# Patient Record
Sex: MALE | Race: WHITE | NOT HISPANIC OR LATINO | Employment: FULL TIME | URBAN - METROPOLITAN AREA
[De-identification: names, ages, dates, MRNs, and addresses within clinical notes are randomized per-mention and may not be internally consistent; named-entity substitution may affect disease eponyms.]

---

## 2017-09-01 ENCOUNTER — HISTORICAL (OUTPATIENT)
Dept: LAB | Facility: HOSPITAL | Age: 32
End: 2017-09-01

## 2017-12-01 ENCOUNTER — HISTORICAL (OUTPATIENT)
Dept: LAB | Facility: HOSPITAL | Age: 32
End: 2017-12-01

## 2017-12-03 LAB — FINAL CULTURE: NO GROWTH

## 2018-10-26 ENCOUNTER — HISTORICAL (OUTPATIENT)
Dept: ADMINISTRATIVE | Facility: HOSPITAL | Age: 33
End: 2018-10-26

## 2018-10-26 LAB
ABS NEUT (OLG): 5.2
ALBUMIN SERPL-MCNC: 4.6 GM/DL (ref 3.4–5)
ALBUMIN/GLOB SERPL: 1.92 {RATIO} (ref 1.5–2.5)
ALP SERPL-CCNC: 50 UNIT/L (ref 38–126)
ALT SERPL-CCNC: 21 UNIT/L (ref 7–52)
APPEARANCE, UA: CLEAR
AST SERPL-CCNC: 18 UNIT/L (ref 15–37)
BACTERIA #/AREA URNS AUTO: NORMAL /HPF
BILIRUB SERPL-MCNC: 1.8 MG/DL (ref 0.2–1)
BILIRUB UR QL STRIP: NEGATIVE MG/DL
BILIRUBIN DIRECT+TOT PNL SERPL-MCNC: 0.4 MG/DL (ref 0–0.5)
BILIRUBIN DIRECT+TOT PNL SERPL-MCNC: 1.4 MG/DL
BUN SERPL-MCNC: 9 MG/DL (ref 7–18)
CALCIUM SERPL-MCNC: 9 MG/DL (ref 8.5–10)
CHLORIDE SERPL-SCNC: 101 MMOL/L (ref 98–107)
CHOLEST SERPL-MCNC: 140 MG/DL (ref 0–200)
CHOLEST/HDLC SERPL: 3.7 {RATIO}
CO2 SERPL-SCNC: 31 MMOL/L (ref 21–32)
COLOR UR: NORMAL
CREAT SERPL-MCNC: 0.79 MG/DL (ref 0.6–1.3)
ERYTHROCYTE [DISTWIDTH] IN BLOOD BY AUTOMATED COUNT: 12.7 % (ref 11.5–17)
GLOBULIN SER-MCNC: 2.4 GM/DL (ref 1.2–3)
GLUCOSE (UA): NEGATIVE MG/DL
GLUCOSE SERPL-MCNC: 93 MG/DL (ref 74–106)
HCT VFR BLD AUTO: 46.5 % (ref 42–52)
HDLC SERPL-MCNC: 38 MG/DL (ref 35–60)
HGB BLD-MCNC: 15.8 GM/DL (ref 14–18)
HGB UR QL STRIP: NEGATIVE UNIT/L
KETONES UR QL STRIP: NEGATIVE MG/DL
LDLC SERPL CALC-MCNC: 93 MG/DL (ref 0–129)
LEUKOCYTE ESTERASE UR QL STRIP: NEGATIVE UNIT/L
LYMPHOCYTES # BLD AUTO: 2.3 X10(3)/MCL (ref 0.6–3.4)
LYMPHOCYTES NFR BLD AUTO: 26.9 % (ref 13–40)
MCH RBC QN AUTO: 31.6 PG (ref 27–31.2)
MCHC RBC AUTO-ENTMCNC: 34 GM/DL (ref 32–36)
MCV RBC AUTO: 93 FL (ref 80–94)
MONOCYTES # BLD AUTO: 0.9 X10(3)/MCL (ref 0–1.8)
MONOCYTES NFR BLD AUTO: 10.8 % (ref 0.1–24)
NEUTROPHILS NFR BLD AUTO: 62.3 % (ref 47–80)
NITRITE UR QL STRIP.AUTO: NEGATIVE
PH UR STRIP: 7.5 [PH]
PLATELET # BLD AUTO: 228 X10(3)/MCL (ref 130–400)
PMV BLD AUTO: 9.4 FL
POTASSIUM SERPL-SCNC: 3.6 MMOL/L (ref 3.5–5.1)
PROT SERPL-MCNC: 7 GM/DL (ref 6.4–8.2)
PROT UR QL STRIP: NEGATIVE MG/DL
RBC # BLD AUTO: 5 X10(6)/MCL (ref 4.7–6.1)
RBC #/AREA URNS HPF: NORMAL /HPF
SODIUM SERPL-SCNC: 137 MMOL/L (ref 136–145)
SP GR UR STRIP: 1.01
SQUAMOUS EPITHELIAL, UA: NORMAL /LPF
TRIGL SERPL-MCNC: 73 MG/DL (ref 30–150)
UROBILINOGEN UR STRIP-ACNC: 0.2 MG/DL
VLDLC SERPL CALC-MCNC: 14.6 MG/DL
WBC # SPEC AUTO: 8.4 X10(3)/MCL (ref 4.5–11.5)
WBC #/AREA URNS AUTO: NORMAL /[HPF]

## 2019-08-26 ENCOUNTER — HISTORICAL (OUTPATIENT)
Dept: LAB | Facility: HOSPITAL | Age: 34
End: 2019-08-26

## 2019-09-20 ENCOUNTER — HISTORICAL (OUTPATIENT)
Dept: ADMINISTRATIVE | Facility: HOSPITAL | Age: 34
End: 2019-09-20

## 2019-09-20 LAB
ABS NEUT (OLG): 3.4 X10(3)/MCL (ref 2.1–9.2)
ALBUMIN SERPL-MCNC: 4.7 GM/DL (ref 3.4–5)
ALBUMIN/GLOB SERPL: 2.61 {RATIO} (ref 1.5–2.5)
ALP SERPL-CCNC: 47 UNIT/L (ref 38–126)
ALT SERPL-CCNC: 27 UNIT/L (ref 7–52)
APPEARANCE, UA: CLEAR
AST SERPL-CCNC: 26 UNIT/L (ref 15–37)
BACTERIA #/AREA URNS AUTO: ABNORMAL /HPF
BILIRUB SERPL-MCNC: 1.4 MG/DL (ref 0.2–1)
BILIRUB UR QL STRIP: NEGATIVE MG/DL
BILIRUBIN DIRECT+TOT PNL SERPL-MCNC: 0.3 MG/DL (ref 0–0.5)
BILIRUBIN DIRECT+TOT PNL SERPL-MCNC: 1.1 MG/DL
BUN SERPL-MCNC: 13 MG/DL (ref 7–18)
CALCIUM SERPL-MCNC: 9.2 MG/DL (ref 8.5–10)
CHLORIDE SERPL-SCNC: 102 MMOL/L (ref 98–107)
CHOLEST SERPL-MCNC: 160 MG/DL (ref 0–200)
CHOLEST/HDLC SERPL: 3.2 {RATIO}
CO2 SERPL-SCNC: 30 MMOL/L (ref 21–32)
COLOR UR: YELLOW
CREAT SERPL-MCNC: 0.89 MG/DL (ref 0.6–1.3)
ERYTHROCYTE [DISTWIDTH] IN BLOOD BY AUTOMATED COUNT: 12.7 % (ref 11.5–17)
GLOBULIN SER-MCNC: 1.8 GM/DL (ref 1.2–3)
GLUCOSE (UA): NEGATIVE MG/DL
GLUCOSE SERPL-MCNC: 109 MG/DL (ref 74–106)
HCT VFR BLD AUTO: 43.3 % (ref 42–52)
HDLC SERPL-MCNC: 50 MG/DL (ref 35–60)
HGB BLD-MCNC: 15.4 GM/DL (ref 14–18)
HGB UR QL STRIP: NEGATIVE UNIT/L
KETONES UR QL STRIP: NEGATIVE MG/DL
LDLC SERPL CALC-MCNC: 114 MG/DL (ref 0–129)
LEUKOCYTE ESTERASE UR QL STRIP: NEGATIVE UNIT/L
LYMPHOCYTES # BLD AUTO: 1.5 X10(3)/MCL (ref 0.6–3.4)
LYMPHOCYTES NFR BLD AUTO: 27.2 % (ref 13–40)
MCH RBC QN AUTO: 31.4 PG (ref 27–31.2)
MCHC RBC AUTO-ENTMCNC: 36 GM/DL (ref 32–36)
MCV RBC AUTO: 88 FL (ref 80–94)
MONOCYTES # BLD AUTO: 0.5 X10(3)/MCL (ref 0.1–1.3)
MONOCYTES NFR BLD AUTO: 9.9 % (ref 0.1–24)
NEUTROPHILS NFR BLD AUTO: 62.9 % (ref 47–80)
NITRITE UR QL STRIP.AUTO: NEGATIVE
PH UR STRIP: 7 [PH]
PLATELET # BLD AUTO: 182 X10(3)/MCL (ref 130–400)
PMV BLD AUTO: 9.7 FL (ref 9.4–12.4)
POTASSIUM SERPL-SCNC: 3.8 MMOL/L (ref 3.5–5.1)
PROT SERPL-MCNC: 6.5 GM/DL (ref 6.4–8.2)
PROT UR QL STRIP: ABNORMAL MG/DL
RBC # BLD AUTO: 4.91 X10(6)/MCL (ref 4.7–6.1)
RBC #/AREA URNS HPF: ABNORMAL /HPF
SODIUM SERPL-SCNC: 138 MMOL/L (ref 136–145)
SP GR UR STRIP: 1.02
SQUAMOUS EPITHELIAL, UA: ABNORMAL /LPF
TRIGL SERPL-MCNC: 64 MG/DL (ref 30–150)
UROBILINOGEN UR STRIP-ACNC: 0.2 MG/DL
VLDLC SERPL CALC-MCNC: 12.8 MG/DL
WBC # SPEC AUTO: 5.4 X10(3)/MCL (ref 4.5–11.5)
WBC #/AREA URNS AUTO: ABNORMAL /[HPF]

## 2020-09-22 ENCOUNTER — HISTORICAL (OUTPATIENT)
Dept: ADMINISTRATIVE | Facility: HOSPITAL | Age: 35
End: 2020-09-22

## 2020-09-22 LAB
ABS NEUT (OLG): 3.2 X10(3)/MCL (ref 2.1–9.2)
ALBUMIN SERPL-MCNC: 4.7 GM/DL (ref 3.4–5)
ALBUMIN/GLOB SERPL: 2.94 {RATIO} (ref 1.5–2.5)
ALP SERPL-CCNC: 51 UNIT/L (ref 38–126)
ALT SERPL-CCNC: 20 UNIT/L (ref 7–52)
APPEARANCE, UA: CLEAR
AST SERPL-CCNC: 17 UNIT/L (ref 15–37)
BACTERIA #/AREA URNS AUTO: NORMAL /HPF
BILIRUB SERPL-MCNC: 1.9 MG/DL (ref 0.2–1)
BILIRUB UR QL STRIP: NEGATIVE MG/DL
BILIRUBIN DIRECT+TOT PNL SERPL-MCNC: 0.4 MG/DL (ref 0–0.5)
BILIRUBIN DIRECT+TOT PNL SERPL-MCNC: 1.5 MG/DL
BUN SERPL-MCNC: 10 MG/DL (ref 7–18)
CALCIUM SERPL-MCNC: 9.4 MG/DL (ref 8.5–10.1)
CHLORIDE SERPL-SCNC: 103 MMOL/L (ref 98–107)
CHOLEST SERPL-MCNC: 138 MG/DL (ref 0–200)
CHOLEST/HDLC SERPL: 3.3 {RATIO}
CO2 SERPL-SCNC: 30 MMOL/L (ref 21–32)
COLOR UR: YELLOW
CREAT SERPL-MCNC: 0.79 MG/DL (ref 0.6–1.3)
ERYTHROCYTE [DISTWIDTH] IN BLOOD BY AUTOMATED COUNT: 12.8 % (ref 11.5–17)
GLOBULIN SER-MCNC: 1.6 GM/DL (ref 1.2–3)
GLUCOSE (UA): NEGATIVE MG/DL
GLUCOSE SERPL-MCNC: 108 MG/DL (ref 74–106)
HCT VFR BLD AUTO: 45.9 % (ref 42–52)
HDLC SERPL-MCNC: 42 MG/DL (ref 35–60)
HGB BLD-MCNC: 16 GM/DL (ref 14–18)
HGB UR QL STRIP: NEGATIVE UNIT/L
KETONES UR QL STRIP: NEGATIVE MG/DL
LDLC SERPL CALC-MCNC: 100 MG/DL (ref 0–129)
LEUKOCYTE ESTERASE UR QL STRIP: NEGATIVE UNIT/L
LYMPHOCYTES # BLD AUTO: 1.7 X10(3)/MCL (ref 0.6–3.4)
LYMPHOCYTES NFR BLD AUTO: 31 % (ref 13–40)
MCH RBC QN AUTO: 31.2 PG (ref 27–31.2)
MCHC RBC AUTO-ENTMCNC: 35 GM/DL (ref 32–36)
MCV RBC AUTO: 90 FL (ref 80–94)
MONOCYTES # BLD AUTO: 0.5 X10(3)/MCL (ref 0.1–1.3)
MONOCYTES NFR BLD AUTO: 9.5 % (ref 0.1–24)
NEUTROPHILS NFR BLD AUTO: 59.5 % (ref 47–80)
NITRITE UR QL STRIP.AUTO: NEGATIVE
PH UR STRIP: 8 [PH]
PLATELET # BLD AUTO: 217 X10(3)/MCL (ref 130–400)
PMV BLD AUTO: 9.9 FL (ref 9.4–12.4)
POTASSIUM SERPL-SCNC: 4.4 MMOL/L (ref 3.5–5.1)
PROT SERPL-MCNC: 6.3 GM/DL (ref 6.4–8.2)
PROT UR QL STRIP: NEGATIVE MG/DL
RBC # BLD AUTO: 5.13 X10(6)/MCL (ref 4.7–6.1)
RBC #/AREA URNS HPF: NORMAL /HPF
SODIUM SERPL-SCNC: 138 MMOL/L (ref 136–145)
SP GR UR STRIP: 1.02
SQUAMOUS EPITHELIAL, UA: NORMAL /LPF
TRIGL SERPL-MCNC: 75 MG/DL (ref 30–150)
TSH SERPL-ACNC: 1.24 MIU/ML (ref 0.35–4.94)
UROBILINOGEN UR STRIP-ACNC: 0.2 MG/DL
VLDLC SERPL CALC-MCNC: 15 MG/DL
WBC # SPEC AUTO: 5.4 X10(3)/MCL (ref 4.5–11.5)
WBC #/AREA URNS AUTO: NORMAL /[HPF]

## 2020-09-23 LAB
EST. AVERAGE GLUCOSE BLD GHB EST-MCNC: 97 MG/DL
HBA1C MFR BLD: 5 % (ref 4.4–6.4)

## 2021-09-28 ENCOUNTER — HISTORICAL (OUTPATIENT)
Dept: ADMINISTRATIVE | Facility: HOSPITAL | Age: 36
End: 2021-09-28

## 2021-09-28 LAB
ABS NEUT (OLG): 2.9 X10(3)/MCL (ref 2.1–9.2)
ALBUMIN SERPL-MCNC: 4.8 GM/DL (ref 3.4–5)
ALBUMIN/GLOB SERPL: 2.67 {RATIO} (ref 1.5–2.5)
ALP SERPL-CCNC: 51 UNIT/L (ref 38–126)
ALT SERPL-CCNC: 22 UNIT/L (ref 7–52)
APPEARANCE, UA: CLEAR
AST SERPL-CCNC: 17 UNIT/L (ref 15–37)
BACTERIA #/AREA URNS AUTO: NORMAL /HPF
BILIRUB SERPL-MCNC: 1.7 MG/DL (ref 0.2–1)
BILIRUB UR QL STRIP: NEGATIVE MG/DL
BILIRUBIN DIRECT+TOT PNL SERPL-MCNC: 0.4 MG/DL (ref 0–0.5)
BILIRUBIN DIRECT+TOT PNL SERPL-MCNC: 1.3 MG/DL
BUN SERPL-MCNC: 10 MG/DL (ref 7–18)
CALCIUM SERPL-MCNC: 9.2 MG/DL (ref 8.5–10.1)
CHLORIDE SERPL-SCNC: 103 MMOL/L (ref 98–107)
CHOLEST SERPL-MCNC: 152 MG/DL (ref 0–200)
CHOLEST/HDLC SERPL: 4.2 {RATIO}
CO2 SERPL-SCNC: 30 MMOL/L (ref 21–32)
COLOR UR: YELLOW
CREAT SERPL-MCNC: 0.83 MG/DL (ref 0.6–1.3)
ERYTHROCYTE [DISTWIDTH] IN BLOOD BY AUTOMATED COUNT: 12.8 % (ref 11.5–17)
GLOBULIN SER-MCNC: 1.9 GM/DL (ref 1.2–3)
GLUCOSE (UA): NEGATIVE MG/DL
GLUCOSE SERPL-MCNC: 86 MG/DL (ref 74–106)
HCT VFR BLD AUTO: 46 % (ref 42–52)
HDLC SERPL-MCNC: 36 MG/DL (ref 35–60)
HGB BLD-MCNC: 15.8 GM/DL (ref 14–18)
HGB UR QL STRIP: NEGATIVE UNIT/L
KETONES UR QL STRIP: NEGATIVE MG/DL
LDLC SERPL CALC-MCNC: 96 MG/DL (ref 0–129)
LEUKOCYTE ESTERASE UR QL STRIP: NEGATIVE UNIT/L
LYMPHOCYTES # BLD AUTO: 1.9 X10(3)/MCL (ref 0.6–3.4)
LYMPHOCYTES NFR BLD AUTO: 37.1 % (ref 13–40)
MCH RBC QN AUTO: 30.6 PG (ref 27–31.2)
MCHC RBC AUTO-ENTMCNC: 34 GM/DL (ref 32–36)
MCV RBC AUTO: 89 FL (ref 80–94)
MONOCYTES # BLD AUTO: 0.3 X10(3)/MCL (ref 0.1–1.3)
MONOCYTES NFR BLD AUTO: 5.8 % (ref 0.1–24)
NEUTROPHILS NFR BLD AUTO: 57.1 % (ref 47–80)
NITRITE UR QL STRIP.AUTO: NEGATIVE
PH UR STRIP: 8 [PH]
PLATELET # BLD AUTO: 254 X10(3)/MCL (ref 130–400)
PMV BLD AUTO: 9.4 FL (ref 9.4–12.4)
POTASSIUM SERPL-SCNC: 4.1 MMOL/L (ref 3.5–5.1)
PROT SERPL-MCNC: 6.6 GM/DL (ref 6.4–8.2)
PROT UR QL STRIP: NEGATIVE MG/DL
RBC # BLD AUTO: 5.16 X10(6)/MCL (ref 4.7–6.1)
RBC #/AREA URNS HPF: NORMAL /HPF
SODIUM SERPL-SCNC: 140 MMOL/L (ref 136–145)
SP GR UR STRIP: 1.02
SQUAMOUS EPITHELIAL, UA: NORMAL /LPF
TRIGL SERPL-MCNC: 121 MG/DL (ref 30–150)
UROBILINOGEN UR STRIP-ACNC: 0.2 MG/DL
VLDLC SERPL CALC-MCNC: 24.2 MG/DL
WBC # SPEC AUTO: 5.1 X10(3)/MCL (ref 4.5–11.5)
WBC #/AREA URNS AUTO: NORMAL /[HPF]

## 2021-09-29 LAB — EST CREAT CLEARANCE SER (OHS): 110.87 ML/MIN

## 2022-04-10 ENCOUNTER — HISTORICAL (OUTPATIENT)
Dept: ADMINISTRATIVE | Facility: HOSPITAL | Age: 37
End: 2022-04-10

## 2022-04-25 VITALS
HEIGHT: 66 IN | BODY MASS INDEX: 22.25 KG/M2 | WEIGHT: 138.44 LBS | DIASTOLIC BLOOD PRESSURE: 70 MMHG | SYSTOLIC BLOOD PRESSURE: 110 MMHG

## 2022-05-03 NOTE — HISTORICAL [facility]
This is a historical note converted from [facility]. Formatting and pictures may have been removed.  Please reference [place] for original formatting and attached multimedia. Chief Complaint  cpx/not fasting  History of Present Illness  Pt is here today for wellness CPX.? He has is now working at the [email or web address] office.? He is no longer on anxiety meds and doing ok.  Review of Systems  GENERAL:?no? unexplained wt ?gain?4lbs ,no ?fever, fatigue, chills, night sweats or ?weakness  HEENT: no?? sore throat, ?ear pain, ?sinus pressure, ?nasal congestion, or ?rhinorrhea  VISION:?no ?vision changes, ?glaucoma, cataracts, +glasses , B eye pterygium  CARDIAC: no? chest pain,??palpitations,?Dyspnea on exertion, ?orthopnea  RESPIRATORY:?no??cough,?wheezing, sputum production or?SOB--got 1st covid vaccine  GI: no???abdominal pain, n&v,?constipation, diarrhea,??blood in stool orno ?family history of colon cancer?_  :?no? dysuria, ?hematuria, ?frequency, urgency, ?incontinence,? testicular pain/swelling,?+?family history of prostate cancergrandfather  MUSC/SKEL:? no? myalgia, ?weakness, edema,? arthralgia, or ?joint effusion  SKIN:? No?rash, hives,?itching or?sores  NEURO:? No?headaches, numbness, tingling,? weakness, or ?dizziness  PSYCH:? ok anxiety,no ?depression, ?irritability, ?suicidal ideation or hallucinations  ENDO:? No ?polyuria, ?polydipsia, ?polyphagia  HEME:? No Bruising, lymphadenopathy, bleeding disorders ?or?signs of anemia  Physical Exam  Vitals & Measurements  HR:?74(Peripheral)? BP:?110/74?  HT:?167.00?cm? WT:?63.100?kg? BMI:?22.63?  GENERAL: NAD, alert and oriented x 3  SKIN:? no rash or abnormal appearing skin lesions  HEENT:? PERRLA, EOMI, mouth wnl, throat wnl, EAC and TM wnl bilaterally  NECK:? FROM, no lymphadenopathy, no thyroid abnormalities palpable  CHEST:? CTA bilaterally no wheezes, crackles or rubs  CARDIAC:? RRR, no murmurs audible  ABDOMEN:? Soft, nontender, nondistended, NBSx4,?no rebound  or guarding, no HSM  EXTREMITIES:? no clubbing, cyanosis, or edema.? joints wnl. +2 DP/PT pulse bilaterally  NEURO:? no sensory or motor deficits noted. CN II-XII intact. Gait wnl.?  GENITAL: normal testes, no hernia  ?  Assessment/Plan  1.?Wellness examination?Z00.00  ?CBC, CMP, FLP, U/A, Encourage pt to increase cardiovascular exercise and attempt to obtain at least 150 minutes of moderate aerobic exercise per week or 75 minutes of vigorous aerobic exercise weekly.  Ordered:  CBC w/ Auto Diff, Routine collect, 09/28/21 15:41:00 CDT, Blood, Order for future visit, Stop date 09/28/21 15:41:00 CDT, Lab Collect, Wellness examination, 09/28/21 15:41:00 CDT  Clinic Follow-Up Wellness, *Est. 09/28/22 3:00:00 CDT, Order for future visit, Wellness examination, [facility]  Comprehensive Metabolic Panel, Routine collect, 09/28/21 15:41:00 CDT, Blood, Order for future visit, Stop date 09/28/21 15:41:00 CDT, Lab Collect, Wellness examination, 09/28/21 15:41:00 CDT  Lab Collection Request, 09/28/21 15:41:00 CDT, HLINK AMB - AFP, 09/28/21 15:41:00 CDT, Wellness examination  Lipid Panel, Routine collect, 09/28/21 15:41:00 CDT, Blood, Order for future visit, Stop date 09/28/21 15:41:00 CDT, Lab Collect, Wellness examination, 09/28/21 15:41:00 CDT  Preventative Health Care Est 18-39 years [id number] PC, Wellness examination, [facility]LINK AMB - AFP, 09/28/21 15:41:00 CDT  Urinalysis no Reflex, Routine collect, Urine, Order for future visit, 09/28/21 15:41:00 CDT, Stop date 09/28/21 15:41:00 CDT, Nurse collect, Wellness examination  ?  2.?Encounter for immunization?Z23  consider ?flu shot, got 1st covid vaccine  ?  Referrals  Clinic Follow-Up Wellness, *Est. 09/29/22 11:00:00 CDT, Order for future visit, Wellness examination, [facility]   Problem List/Past Medical History  Ongoing  Contact dermatitis  Eczema  Needs flu shot  Sinusitis  Wellness examination  Historical  Allergy  Anxiety  Gilbert syndrome  Procedure/Surgical History  Repair of  varicocele (2014)  CYST REMOVED FROM LEFT KNEE  [place] teeth removed   Medications  Claritin 10 mg oral tablet, 10 mg= 1 tab(s), Oral, Daily  Allergies  penicillin?(UNKNOWN)  sulfa drugs?(UNKNOWN)  Social History  Abuse/Neglect  No, 09/28/2021  No, 09/22/2020  No, 02/26/2020  No, 01/29/2020  No, 09/20/2019  Alcohol  Never, 09/20/2019  Current, 10/26/2018  Current, Liquor, 1-2 times per week, 05/09/2018  Employment/School  Employed, 09/28/2021  Employed, Work/School description: [employer] 09/20/2019  Previous employment/school: [employer] 10/26/2018  Home/Environment  Lives with Spouse., 05/09/2018  Tobacco  Never (less than 100 in lifetime), No, 09/28/2021  Never (less than 100 in lifetime), N/A, 09/22/2020  Never (less than 100 in lifetime), N/A, 02/26/2020  Never (less than 100 in lifetime), N/A, 01/29/2020  Never (less than 100 in lifetime), N/A, 09/20/2019  Never (less than 100 in lifetime), N/A, 05/09/2019  Never smoker, N/A, 10/26/2018  Never smoker, 05/09/2018  Family History  Breast cancer: Mother.  Coronary artery stent: Father.  Diabetes mellitus: Father.  Hypertension.: Father.  Skin cancer: Father.  Immunizations  Vaccine Date Status   COVID-19 MRNA, LNP-S, PF- Pfizer 09/13/2021 Recorded   influenza virus vaccine, inactivated 09/22/2020 Recorded   influenza virus vaccine, inactivated 09/20/2019 Given   influenza virus vaccine, inactivated 11/12/2018 Given   tetanus/diphtheria/pertussis, acel(Tdap) 05/10/2018 Given   influenza virus vaccine, inactivated 10/06/2015 Recorded   influenza virus vaccine, inactivated 10/16/2014 Recorded   meningococcal conjugate vaccine 02/15/2006 Recorded   Health Maintenance  Health Maintenance  ???Pending?(in the next year)  ??? ??OverDue  ??? ? ? ?Alcohol Misuse Screening due??01/02/21??and every 1??year(s)  ??? ??Due?  ??? ? ? ?ADL Screening due??09/28/21??and every 1??year(s)  ??? ? ? ?Depression Screening due??09/28/21??Unknown Frequency  ??? ??Due In Future?  ??? ?  ? ?Obesity Screening not due until??01/01/22??and every 1??year(s)  ???Satisfied?(in the past 1 year)  ??? ??Satisfied?  ??? ? ? ?Blood Pressure Screening on??09/28/21.??Satisfied by Taryn Contreras CMA  ??? ? ? ?Body Mass Index Check on??09/28/21.??Satisfied by Taryn Contreras CMA  ??? ? ? ?Obesity Screening on??09/28/21.??Satisfied by Taryn Contreras CMA  ?

## 2022-05-03 NOTE — HISTORICAL [facility]
This is a historical note converted from Timothy. Formatting and pictures may have been removed.  Please reference Timothy for original formatting and attached multimedia. Chief Complaint  CPX/FASTING  History of Present Illness  Patient is here today for wellness CPX.  Review of Systems  GENERAL:?no? unexplained wt ?loss or gain, no fever, fatigue, chills, night sweats or ?weakness  HEENT: no?? sore throat, ?ear pain, ?sinus pressure, ?nasal congestion, or ?rhinorrhea, + tinnitis  VISION:?no ?vision changes, ?glaucoma, cataracts, +glasses  CARDIAC: no? chest pain,??palpitations,?Dyspnea on exertion, ?orthopnea  RESPIRATORY:?no??cough,?wheezing, sputum production or?SOB  GI: no???abdominal pain, n&v,?constipation, diarrhea,??blood in stool or_? no family history of colon cancer?_  :?no? dysuria, ?hematuria, ?frequency, urgency, ?incontinence,? testicular pain/swelling,?+?family history of prostate cancergrandfather  MUSC/SKEL:? no? myalgia, ?weakness, edema,? arthralgia, or ?joint effusion  SKIN:? No?rash, hives,?itching or?sores--+eczema Dr Benavides  NEURO:? No?headaches, numbness, tingling,? weakness, or ?dizziness  PSYCH:? No anxiety, depression, ?irritability, ?suicidal ideation or hallucinations  ENDO:? No ?polyuria, ?polydipsia, ?polyphagia  HEME:? No Bruising, lymphadenopathy, bleeding disorders ?or?signs of anemia  Physical Exam  Vitals & Measurements  HR:?82(Peripheral)? BP:?108/74?  HT:?167?cm? WT:?59.5?kg? BMI:?21.33?  GENERAL: NAD, alert and oriented x 3  SKIN:?Eczematous rash on ankles?lower legs?and hands  HEENT:? PERRLA, EOMI, mouth wnl, throat wnl, EAC and TM wnl bilaterally  NECK:? FROM, no lymphadenopathy, no thyroid abnormalities palpable  CHEST:? CTA bilaterally no wheezes, crackles or rubs  CARDIAC:? RRR, no murmurs audible  ABDOMEN:? Soft, nontender, nondistended, NBSx4,?no rebound or guarding, no HSM  EXTREMITIES:? no clubbing, cyanosis, or edema.? joints wnl. +2 DP/PT pulse bilaterally  NEURO:?  no sensory or motor deficits noted. CN II-XII intact. Gait wnl.?  GENITAL: normal testes, no hernia  ?  Assessment/Plan  1.?Wellness examination?Z00.00  ?CBC, CMP, FLP, U/A, Encourage pt to increase cardiovascular exercise and attempt to obtain at least 150 minutes of moderate aerobic exercise per week or 75 minutes of vigorous aerobic exercise weekly.  Ordered:  CBC w/ Auto Diff, Routine collect, 09/20/19 10:50:00 CDT, Blood, Order for future visit, Stop date 09/20/19 10:50:00 CDT, Lab Collect, Wellness examination, 09/20/19 10:50:00 CDT  Clinic Follow-Up Wellness, *Est. 09/20/20 3:00:00 CDT, Order for future visit, Wellness examination, [facility]  Comprehensive Metabolic Panel, Routine collect, 09/20/19 10:50:00 CDT, Blood, Order for future visit, Stop date 09/20/19 10:50:00 CDT, Lab Collect, Wellness examination, 09/20/19 10:50:00 CDT  Lipid Panel, Routine collect, 09/20/19 10:50:00 CDT, Blood, Order for future visit, Stop date 09/20/19 10:50:00 CDT, Lab Collect, Wellness examination, 09/20/19 10:50:00 CDT  Preventative Health Care Est 18-39 years [id number] PC, Wellness examination, [facility] AMB - [facility], 09/20/19 10:54:00 CDT  Urinalysis Complete no reflex, Routine collect, Urine, Order for future visit, 09/20/19 10:50:00 CDT, Stop date 09/20/19 10:50:00 CDT, Nurse collect, Wellness examination  ?  2.?Needs flu shot?Z23  ?Today  Ordered:  Influenza Virus Vaccine, Inactivated, 0.5 mL, IM, Once-Unscheduled, first dose 09/20/19 10:53:00 CDT  ?  Orders:  Lab Collection Request, 09/20/19 10:50:00 CDT, [facility]LINK AMB - [facility], 09/20/19 10:50:00 CDT  Referrals  Clinic Follow-Up Wellness, *Est. 09/20/20 3:00:00 CDT, Order for future visit, Wellness examination, [facility]   Problem List/Past Medical History  Ongoing  Contact dermatitis  Eczema  Needs flu shot  Sinusitis  Wellness examination  Historical  Allergy  Gilbert syndrome  Procedure/Surgical History  Repair of varicocele (2014)  CYST REMOVED FROM LEFT KNEE  [place] teeth removed    Medications  Adult Quadrivalent Influenza Vaccine, 0.5 mL, IM, Once-Unscheduled  Allergies  penicillin?(UNKNOWN)  sulfa drugs?(UNKNOWN)  Social History  Abuse/Neglect  No, 09/20/2019  Alcohol  Never, 09/20/2019  Current, 10/26/2018  Current, Liquor, 1-2 times per week, 05/09/2018  Employment/School  Employed, Work/School description: [employer] 09/20/2019  Previous employment/school: [employer] 10/26/2018  Home/Environment  Lives with Spouse., 05/09/2018  Tobacco  Never (less than 100 in lifetime), N/A, 09/20/2019  Never (less than 100 in lifetime), N/A, 05/09/2019  Never smoker, N/A, 10/26/2018  Never smoker, 05/09/2018  Family History  Breast cancer: Mother.  Coronary artery stent: Father.  Hypertension.: Father.  Immunizations  Vaccine Date Status   influenza virus vaccine, inactivated 11/12/2018 Given   tetanus/diphtheria/pertussis, acel(Tdap) 05/10/2018 Given   influenza virus vaccine, inactivated 10/06/2015 Recorded   influenza virus vaccine, inactivated 10/16/2014 Recorded   meningococcal conjugate vaccine 02/15/2006 Recorded   Health Maintenance  Health Maintenance  ???Pending?(in the next year)  ??? ??OverDue  ??? ? ? ?Alcohol Misuse Screening due??01/01/19??and every 1??year(s)  ??? ??Due?  ??? ? ? ?ADL Screening due??09/20/19??and every 1??year(s)  ??? ? ? ?Depression Screening due??09/20/19??and every?  ??? ? ? ?Influenza Vaccine due??09/20/19??and every?  ??? ??Due In Future?  ??? ? ? ?Obesity Screening not due until??01/01/20??and every 1??year(s)  ??? ? ? ?Blood Pressure Screening not due until??09/19/20??and every 1??year(s)  ??? ? ? ?Body Mass Index Check not due until??09/19/20??and every 1??year(s)  ???Satisfied?(in the past 1 year)  ??? ??Satisfied?  ??? ? ? ?Blood Pressure Screening on??09/20/19.??Satisfied by Taryn Contreras CMA  ??? ? ? ?Body Mass Index Check on??09/20/19.??Satisfied by Taryn Contreras CMA  ??? ? ? ?Influenza Vaccine on??09/20/19.??Satisfied by Viktor MADSEN, Rikki  S  ??? ? ? ?Obesity Screening on??09/20/19.??Satisfied by Taryn Contreras CMA  ?

## 2022-05-03 NOTE — HISTORICAL [facility]
This is a historical note converted from [place]. Formatting and pictures may have been removed.  Please reference [place] for original formatting and attached multimedia. Chief Complaint  CPX/FASTING.  History of Present Illness  Patient presents today for wellness CPX.? He has appointment with Dr. Deniz MADSEN?next week?for?ongoing intermittent?testicular pain x2 months?and a consultation for vasectomy.? He has a history of a varicocele with repair 3 years ago.? He states the pain is intermittent and dull in nature. He denies dysuria, hematuria, painful erections, change in ejaculation characteristics, fever, chills, abd pain.? He does have ED at times, which has been an ongoing complaint for years. ?He stopped taking BuSpar 50 mg twice a day due to lack of Skin. ?He continues to complain of anxiety?and stress.? He admits to being on Zoloft in the past but denies continuing due to?extreme fatigue.??He has?3-month-old twins and a 2-year-old?and notes to increased anxiety?and excessive worrying. ?He also admits to mild irritability as well. ?He denies insomnia, depression,?panic attacks, SI, HI.  Review of Systems  GENERAL: No weight loss, no?weight gain, no fever, no fatigue, no chills, no night sweats  HEENT: No sore throat, no ear pain, no sinus pressure, no nasal congestion, no rhinorrhea, no decreased hearing  VISION: No vision changes, no blurry vision, no double vision, no glaucoma, no cataracts, +glasses, no?contacts  LAST EYE EXAM: due  NECK: no LAD  CARDIAC: No chest pain, no palpitations, no dyspnea on exertion, no orthopnea  RESPIRATORY: No cough, no wheezing, no sputum production, no?SOB  GI: No abdominal pain, no N/V, no constipation, no diarrhea, no blood in stool,?(- )?family history of Colon Ca  : No dysuria, no hematuria, no frequency, no urgency, no incontinence, +?intermittent?testicular pain, no swelling, (+) family history of Prostate Ca,?maternal?grandfather  MUSC/SKEL: No myalgia, no weakness,  no edema, no arthralgia, no joint swelling/effusions  SKIN: No rashes, no hives, no itching, no sores  NEURO: No HA, no numbness, no tingling, no weakness, no dizziness  PSYCH: +anxiety, no depression, +irritability, no panic attacks,?no s/i, no h/i, no?hallucinations  ENDO: No polyuria, no polyphagia,?no polydipsia  HEME: No bruising, no bleeding disorders, no signs of anemia.?  Physical Exam  Vitals & Measurements  T:?36.7? ?C (Oral)? HR:?74(Peripheral)? BP:?104/76?  HT:?167.00?cm? WT:?61.300?kg? BMI:?21.98?  General: Well developed, well nourished in no apparent distress, alert and oriented x3  Skin:?No rash or abnormal?lesions  HEENT:?Normocephalic, PERRLA, EOMI, mouth WNL, throat WNL, nares normal, EAC and TM WNL bilaterally  Neck:?FROM, no LAD, no thyroid abnormalities?palpable  Chest:?CTA?bilaterally, no wheezes crackles or rubs  Cardiac: RRR,?no murmurs, rubs, gallops  Abdomen: Soft, nontender,?nondistended, NBSx4, no rebound tenderness or guarding, no HSM  Extremities:?No clubbing, cyanosis, or edema.? Joints WNL, +2 DP/PT pulses bilaterally  Neuro: No sensory or motor defects noted. CN II-XII intact. Gait WNL.  Genital: normal testes appearance, no swelling, mild TTP upper anterior?left?testicle, no enlarged/engorged vessels, no TTP epididymidis,?no hernia  Assessment/Plan  1.?Wellness examination?Z00.00  CBC, CMP, FLP, U/A today.  Encourage pt to increase cardiovascular exercise and attempt to obtain at least 150 minutes of moderate aerobic exercise per week or 75 minutes of vigorous aerobic exercise weekly.  Ordered:  Clinic Follow-Up Wellness, *Est. 09/28/21 15:30:00 CDT, Order for future visit, Wellness examination, [facility]  Comprehensive Metabolic Panel, Routine collect, 09/22/20 9:31:00 CDT, Blood, Stop date 09/22/20 9:31:00 CDT, Lab Collect, Wellness examination  Anxiety, 09/22/20 9:31:00 CDT  Lipid Panel, Routine collect, 09/22/20 9:31:00 CDT, Blood, Stop date 09/22/20 9:31:00 CDT, Lab Collect,  Wellness examination  Anxiety, 09/22/20 9:31:00 CDT  Preventative Health Care Est 18-39 years [id number] PC, Wellness examination  Anxiety, [facility]LINK AMB - AFP, 09/22/20 9:17:00 CDT  Thyroid Stimulating Hormone, Routine collect, 09/22/20 9:31:00 CDT, Blood, Stop date 09/22/20 9:31:00 CDT, Lab Collect, Wellness examination  Anxiety, 09/22/20 9:31:00 CDT  Urinalysis no Reflex, Routine collect, Urine, 09/22/20 9:31:00 CDT, Stop date 09/22/20 9:31:00 CDT, Nurse collect, Wellness examination  Anxiety  ?  2.?Anxiety?F41.9  Unstable at this time, will start Effexor secondary to anxiety and follow-up. in 4 weeks.? Risks/benefits/side effects of medication?and alternatives discussed with patient in great detail?who voiced clear understanding.? ED precautions per suicidal ideation,?homicidal ideation,?self-harm?and verbalized understanding.? If concerns or issues arise after office hours,?call 911 or report to emergency room; patient verbalized understanding.? Informed patient to not stop medication without informing office and medical staff.?  Ordered:  Clinic Follow up, *Est. 10/26/20 11:30:00 CDT, Order for future visit, Anxiety, [facility]  Clinic Follow-Up Wellness, *Est. 09/28/21 15:30:00 CDT, Order for future visit, Wellness examination, [facility]  Comprehensive Metabolic Panel, Routine collect, 09/22/20 9:31:00 CDT, Blood, Stop date 09/22/20 9:31:00 CDT, Lab Collect, Wellness examination  Anxiety, 09/22/20 9:31:00 CDT  Lipid Panel, Routine collect, 09/22/20 9:31:00 CDT, Blood, Stop date 09/22/20 9:31:00 CDT, Lab Collect, Wellness examination  Anxiety, 09/22/20 9:31:00 CDT  Preventative Health Care Est 18-39 years [id number] PC, Wellness examination  Anxiety, [facility]LINK AMB - AFP, 09/22/20 9:17:00 CDT  Thyroid Stimulating Hormone, Routine collect, 09/22/20 9:31:00 CDT, Blood, Stop date 09/22/20 9:31:00 CDT, Lab Collect, Wellness examination  Anxiety, 09/22/20 9:31:00 CDT  Urinalysis no Reflex, Routine collect, Urine, 09/22/20  9:31:00 CDT, Stop date 09/22/20 9:31:00 CDT, Nurse collect, Wellness examination  Anxiety  ?  3.?Testicular pain?N50.819  Intermittent mild?discomfort--FU with Deniz MADSEN next week.  Continue briefs for support, NSAIDs.?  ED precautions per worsened pain/testicular swelling/redness, fever.  ?  Orders:  venlafaxine, 75 mg = 1 cap(s), Oral, Daily, # 30 cap(s), 1 Refill(s), Pharmacy: [id number], 167, cm, Height/Length Dosing, 09/22/20 8:45:00 CDT, 61.3, kg, Weight Dosing, 09/22/20 8:45:00 CDT  Referrals  Clinic Follow up, *Est. 10/26/20 11:30:00 CDT, Order for future visit, Anxiety, HLink AFP  Clinic Follow-Up Wellness, *Est. 09/28/21 15:30:00 CDT, Order for future visit, Wellness examination, HLink AFP   Problem List/Past Medical History  Ongoing  Contact dermatitis  Eczema  Needs flu shot  Sinusitis  Wellness examination  Historical  Allergy  Gilbert syndrome  Procedure/Surgical History  Repair of varicocele (2014)  CYST REMOVED FROM LEFT KNEE  [place] teeth removed   Medications  Effexor XR 75 mg oral capsule, extended release, 75 mg= 1 cap(s), Oral, Daily, 1 refills  Zyrtec 10 mg oral tablet, 10 mg= 1 tab(s), Oral, Daily  Allergies  penicillin?(UNKNOWN)  sulfa drugs?(UNKNOWN)  Social History  Abuse/Neglect  No, 09/22/2020  No, 02/26/2020  No, 01/29/2020  No, 09/20/2019  Alcohol  Never, 09/20/2019  Current, 10/26/2018  Current, Liquor, 1-2 times per week, 05/09/2018  Employment/School  Employed, Work/School description: [employer] 09/20/2019  Previous employment/school: [employer] 10/26/2018  Home/Environment  Lives with Spouse., 05/09/2018  Tobacco  Never (less than 100 in lifetime), N/A, 09/22/2020  Never (less than 100 in lifetime), N/A, 02/26/2020  Never (less than 100 in lifetime), N/A, 01/29/2020  Never (less than 100 in lifetime), N/A, 09/20/2019  Never (less than 100 in lifetime), N/A, 05/09/2019  Never smoker, N/A, 10/26/2018  Never smoker, 05/09/2018  Family History  Breast cancer:  Mother.  Coronary artery stent: Father.  Hypertension.: Father.  Immunizations  Vaccine Date Status   influenza virus vaccine, inactivated 09/20/2019 Given   influenza virus vaccine, inactivated 11/12/2018 Given   tetanus/diphtheria/pertussis, acel(Tdap) 05/10/2018 Given   influenza virus vaccine, inactivated 10/06/2015 Recorded   influenza virus vaccine, inactivated 10/16/2014 Recorded   meningococcal conjugate vaccine 02/15/2006 Recorded   Health Maintenance  Health Maintenance  ???Pending?(in the next year)  ??? ??OverDue  ??? ? ? ?Alcohol Misuse Screening due??01/02/20??and every 1??year(s)  ??? ??Due?  ??? ? ? ?ADL Screening due??09/22/20??and every 1??year(s)  ??? ? ? ?Depression Screening due??09/22/20??and every?  ??? ? ? ?Influenza Vaccine due??09/22/20??and every?  ??? ??Due In Future?  ??? ? ? ?Obesity Screening not due until??01/01/21??and every 1??year(s)  ???Satisfied?(in the past 1 year)  ??? ??Satisfied?  ??? ? ? ?Blood Pressure Screening on??09/22/20.??Satisfied by Taryn Contreras CMA  ??? ? ? ?Body Mass Index Check on??09/22/20.??Satisfied by Taryn Contreras CMA  ??? ? ? ?Obesity Screening on??09/22/20.??Satisfied by Taryn Contreras CMA  ?      agree with rosy, kike, tx, and followup.? Keep ov with TONI Tuesday.

## 2022-05-04 PROBLEM — J32.9 SINUSITIS: Status: ACTIVE | Noted: 2022-05-04

## 2022-06-06 PROBLEM — F98.8 ATTENTION DEFICIT DISORDER: Status: ACTIVE | Noted: 2022-06-06

## 2022-06-07 PROBLEM — S01.312A LACERATION OF LEFT EARLOBE: Status: ACTIVE | Noted: 2022-06-07

## 2022-09-21 PROBLEM — K21.9 GASTROESOPHAGEAL REFLUX DISEASE: Status: ACTIVE | Noted: 2022-09-21

## 2022-09-21 PROBLEM — R09.A2 GLOBUS SENSATION: Status: ACTIVE | Noted: 2022-09-21

## 2022-09-21 PROBLEM — J30.9 ALLERGIC RHINITIS: Status: ACTIVE | Noted: 2022-09-21

## 2022-09-21 PROBLEM — Z00.00 ENCOUNTER FOR WELLNESS EXAMINATION IN ADULT: Status: ACTIVE | Noted: 2022-09-21

## 2022-09-21 PROBLEM — L30.9 ECZEMA: Status: ACTIVE | Noted: 2022-09-21

## 2022-09-21 PROBLEM — Z23 IMMUNIZATION DUE: Status: ACTIVE | Noted: 2022-09-21

## 2022-12-13 PROBLEM — J06.9 URI, ACUTE: Status: ACTIVE | Noted: 2022-12-13

## 2022-12-26 PROBLEM — Z00.00 ENCOUNTER FOR WELLNESS EXAMINATION IN ADULT: Status: RESOLVED | Noted: 2022-09-21 | Resolved: 2022-12-26

## 2023-09-27 PROBLEM — F41.9 ANXIETY: Status: ACTIVE | Noted: 2023-09-27

## 2023-09-28 PROBLEM — J01.90 ACUTE SINUSITIS: Status: ACTIVE | Noted: 2022-05-04

## 2023-09-28 PROCEDURE — 86803 HEPATITIS C AB TEST: CPT | Performed by: FAMILY MEDICINE

## 2023-09-28 PROCEDURE — 87389 HIV-1 AG W/HIV-1&-2 AB AG IA: CPT | Performed by: FAMILY MEDICINE

## 2024-01-01 PROBLEM — Z00.00 ENCOUNTER FOR WELLNESS EXAMINATION IN ADULT: Status: RESOLVED | Noted: 2022-09-21 | Resolved: 2024-01-01

## 2024-01-01 PROBLEM — J01.90 ACUTE SINUSITIS: Status: RESOLVED | Noted: 2022-05-04 | Resolved: 2024-01-01
